# Patient Record
(demographics unavailable — no encounter records)

---

## 2025-07-21 NOTE — ASSESSMENT
[FreeTextEntry1] : Ms. HANSEN is a 77 yo F who presents for neurosurgical consultation with regards to low back pain with left lower extremity radiculopathy. She was found to have an L5-S1 left disc herniation with mild left-sided canal stenosis and proximal left foraminal stenosis on MRI. She is a candidate for a left L5-S1 microdiscectomy. The risks of the surgery were discussed at length, including but not limited to failure to improve, recurrent herniated disc, paralysis, spinal fluid leak, wound infections, chronic neuropathy/pain, anesthesia complications, postoperative weakness, and the potential need for further surgical intervention. Patient wishes to proceed and will be scheduled accordingly.  I personally reviewed with the NP, this patient's history and physical exam findings, as documented above. I have discussed the relevant areas of concern, having direct implications to the presenting problems and illnesses, and I have personally examined all pertinent and positive and negative findings, which impact their neurosurgical treatment.   Sarah Pena MS FNP-BC Nurse Practitioner [facility]   Mikayla Rausch MD FAANS Chair, Department of Neurosurgery [facility]

## 2025-07-21 NOTE — ASSESSMENT
[FreeTextEntry1] : Ms. HANSEN is a 77 yo F who presents for neurosurgical consultation with regards to low back pain with left lower extremity radiculopathy. She was found to have an L5-S1 left disc herniation with mild left-sided canal stenosis and proximal left foraminal stenosis on MRI. She is a candidate for a left L5-S1 microdiscectomy. The risks of the surgery were discussed at length, including but not limited to failure to improve, recurrent herniated disc, paralysis, spinal fluid leak, wound infections, chronic neuropathy/pain, anesthesia complications, postoperative weakness, and the potential need for further surgical intervention. Patient wishes to proceed and will be scheduled accordingly.  I personally reviewed with the NP, this patient's history and physical exam findings, as documented above. I have discussed the relevant areas of concern, having direct implications to the presenting problems and illnesses, and I have personally examined all pertinent and positive and negative findings, which impact their neurosurgical treatment.   Sarah Pena MS FNP-BC Nurse Practitioner [facility]   Mikayla Rausch MD FAANS Chair, Department of Neurosurgery [facility]

## 2025-07-21 NOTE — HISTORY OF PRESENT ILLNESS
[de-identified] : Ms. HANSEN is a 77 yo F who presents for neurosurgical consultation with regards to low back pain with left lower extremity radiculopathy. She notes numbness, tingling, and burning to the posterior aspect of her left hamstring following an L5-S1 dermatomal distribution. She has trialed acupuncture and lumbar epidural injections with minimal relief. Her pain is aggravated when sitting, alleviated with ambulation. No bowel/bladder dysfunction. No saddle anesthesia.   MRI of the lumbar spine (Stand Up, 3/4/25) -- L4-5 grade 1 spondylolisthesis. L5-S1 left disc herniation impressing on the thecal sac. Mild left-sided canal stenosis and proximal left foraminal stenosis.    PHYSICAL EXAM: Constitutional: Well appearing, no distress HEENT: Normocephalic Atraumatic Psychiatric: Alert and oriented to all spheres, normal mood Pulmonary: No respiratory distress    Neurologic: CN II-XII grossly intact Palpation: (+) lumbar paraspinal tenderness  Strength: Full strength in all major muscle groups Sensation: Full sensation to light touch in all extremities Reflexes: 2+ patellar 2+ ankle jerk No Ham's bilaterally No Clonus bilaterally  ROM intact Signs: SLR negative bilaterally  Gait: steady, walking w/o assistance.

## 2025-07-21 NOTE — HISTORY OF PRESENT ILLNESS
[de-identified] : Ms. HANSEN is a 79 yo F who presents for neurosurgical consultation with regards to low back pain with left lower extremity radiculopathy. She notes numbness, tingling, and burning to the posterior aspect of her left hamstring following an L5-S1 dermatomal distribution. She has trialed acupuncture and lumbar epidural injections with minimal relief. Her pain is aggravated when sitting, alleviated with ambulation. No bowel/bladder dysfunction. No saddle anesthesia.   MRI of the lumbar spine (Stand Up, 3/4/25) -- L4-5 grade 1 spondylolisthesis. L5-S1 left disc herniation impressing on the thecal sac. Mild left-sided canal stenosis and proximal left foraminal stenosis.    PHYSICAL EXAM: Constitutional: Well appearing, no distress HEENT: Normocephalic Atraumatic Psychiatric: Alert and oriented to all spheres, normal mood Pulmonary: No respiratory distress    Neurologic: CN II-XII grossly intact Palpation: (+) lumbar paraspinal tenderness  Strength: Full strength in all major muscle groups Sensation: Full sensation to light touch in all extremities Reflexes: 2+ patellar 2+ ankle jerk No Ham's bilaterally No Clonus bilaterally  ROM intact Signs: SLR negative bilaterally  Gait: steady, walking w/o assistance.